# Patient Record
Sex: FEMALE | Race: BLACK OR AFRICAN AMERICAN | Employment: OTHER | URBAN - METROPOLITAN AREA
[De-identification: names, ages, dates, MRNs, and addresses within clinical notes are randomized per-mention and may not be internally consistent; named-entity substitution may affect disease eponyms.]

---

## 2017-11-22 ENCOUNTER — HOSPITAL ENCOUNTER (OUTPATIENT)
Dept: PHYSICAL THERAPY | Age: 72
Discharge: HOME | End: 2017-11-22
Payer: MEDICARE

## 2017-11-22 PROCEDURE — G8984 CARRY CURRENT STATUS: HCPCS

## 2017-11-22 PROCEDURE — 97162 PT EVAL MOD COMPLEX 30 MIN: CPT

## 2017-11-22 PROCEDURE — 97110 THERAPEUTIC EXERCISES: CPT

## 2017-11-22 PROCEDURE — G8985 CARRY GOAL STATUS: HCPCS

## 2017-11-22 NOTE — PROGRESS NOTES
Ambulatory/Rehab Services H2 Model Falls Risk Assessment    Risk Factor Pts. ·   Confusion/Disorientation/Impulsivity  []    4 ·   Symptomatic Depression  []   2 ·   Altered Elimination  []   1 ·   Dizziness/Vertigo  []   1 ·   Gender (Male)  []   1 ·   Any administered antiepileptics (anticonvulsants):  []   2 ·   Any administered benzodiazepines:  []   1 ·   Visual Impairment (specify):  []   1 ·   Portable Oxygen Use  []   1 ·   Orthostatic ? BP  []   1 ·   History of Recent Falls (within 3 mos.)  []   5     Ability to Rise from Chair (choose one) Pts. ·   Ability to rise in a single movement  []   0 ·   Pushes up, successful in one attempt  []   1 ·   Multiple attempts, but successful  []   3 ·   Unable to rise without assistance  []   4   Total: (5 or greater = High Risk) 0     Falls Prevention Plan:   []                Physical Limitations to Exercise (specify):   []                Mobility Assistance Device (type):   []                Exercise/Equipment Adaptation (specify):    ©2010 [facility] [address] Patent #[id number].  Federal Law prohibits the replication, distribution or use without written permission from [facility] [email or web address]

## 2017-11-22 NOTE — THERAPY EVALUATION
Marisa Velasquez  : 1945  Payor: Kandi Claude / Plan: [facility] HUMANA MEDICARE CHOICE PPO/PFFS / Product Type: Managed Care Medicare /  [facility] [place]  at [facility]  [address], [address]  [phone]   [phone]        OUTPATIENT PHYSICAL THERAPY:Initial Assessment 2017    ICD-10: Treatment Diagnosis: M62.81  Muscle weakness generalized  Precautions/Allergies:   Latex; Lortab [hydrocodone-acetaminophen]; and Statins-hmg-coa reductase inhibitors   Fall Risk Score: 0 (? 5 = High Risk)  MD Orders: eval and treat MEDICAL/REFERRING DIAGNOSIS:  Multiple myeloma in relapse [C90.02]   DATE OF ONSET: late August/Sept   REFERRING PHYSICIAN: Artemio Andres DO  RETURN PHYSICIAN APPOINTMENT: TBD     INITIAL ASSESSMENT:  Ms. Tracy Wen presents with weakness in the R shoulder that is suspicious of a brachial plexus involvement. She denies any injury or apparent cause of this weakness. She denies pain. Onset was in late August or early Sept and she has not noticed any change. She should benefit from PT to help strengthen R shoulder, maintain range of motion and improve function R shoulder. PROBLEM LIST (Impacting functional limitations):  1. Decreased Strength  2. Decreased ADL/Functional Activities  3. Decreased Activity Tolerance INTERVENTIONS PLANNED:  1. Home Exercise Program (HEP)  2. Manual Therapy  3. Neuromuscular Re-education/Strengthening  4. Range of Motion (ROM)  5. Therapeutic Exercise/Strengthening  6. Modalities as needed, including Estim for strengthening   TREATMENT PLAN:  Effective Dates: 2017 TO 2018. Frequency/Duration: 2 times a week for 8 weeks, and upon reassessment will adjust frequency and duration as progress indicates. GOALS: (Goals have been discussed and agreed upon with patient.)  Short-Term Functional Goals: Time Frame: 4 weeks  1. Improve R shoulder flexion to allow hand to top of head  2.  Pt able to use fork with R hand  3. Pt to be independent witn initial ex for R shoulder stretching  Discharge Goals: Time Frame: 12 weeks  1. Improve score on DASH by 5 points, for improved R shoulder function  2. Pt to demonstrate at least 3- shoulder flexion  3. Pt to be able to position hand for functional ADLs  4. Pt to be independent in HEP  Rehabilitation Potential For Stated Goals: [facility] therapy, I certify that the treatment plan above will be carried out by a therapist or under their direction. Thank you for this referral,  Reshma Ruiz, PT     Referring Physician Signature: Vivek Sparks DO              Date                    The information in this section was collected on 11-22 (except where otherwise noted). HISTORY:   History of Present Injury/Illness (Reason for Referral):  Pt reported that she woke one morning in late August or Sept and could not lift the R arm. She denies any pain, or numbness in the arm. No trauma to cause onset. She did mention bilateral upper trap pain about 2 weeks before this onset. She reports that she has had MRI, CT, PET scans --all inconclusive. Past Medical History/Comorbidities:   Ms. Rica Amaya  has a past medical history of Cancer ([address] and Hypertension. She also has no past medical history of Arrhythmia; Arthritis; Asthma; Autoimmune disease ([address] CAD (coronary artery disease); Chronic kidney disease; Chronic pain; Coagulation defects; COPD; Diabetes ([address] GERD (gastroesophageal reflux disease); Heart failure ([address] Liver disease; Other ill-defined conditions; Psychiatric disorder; PUD (peptic ulcer disease); Seizures ([address] Stroke [facility]); Thromboembolus ([address] or Thyroid disease. Ms. Rica Amaya  has a past surgical history that includes hysterectomy. Social History/Living Environment:     .  is 80 yr old. Able to provide any needed assistance. Prior Level of Function/Work/Activity:  Long ago retired.     Dominant Side: RIGHT  Personal Factors:          Age:  67 y.o. Current Medications:       Current Outpatient Prescriptions:     pregabalin (LYRICA) 100 mg capsule, Take 100 mg by mouth two (2) times a day., Disp: , Rfl:     penicillin v potassium (VEETID) 500 mg tablet, Take 500 mg by mouth two (2) times a day., Disp: , Rfl:     ciprofloxacin (CIPRO) 500 mg tablet, Take 500 mg by mouth two (2) times a day., Disp: , Rfl:     doxepin (SINEQUAN) 25 mg capsule, Take  by mouth nightly., Disp: , Rfl:     triamterene-hydrochlorothiazide (MAXZIDE-25MG) 37.5-25 mg per tablet, Take  by mouth daily. , Disp: , Rfl:     amlodipine (NORVASC) 2.5 mg tablet, Take  by mouth daily. , Disp: , Rfl:     naproxen (NAPROSYN) 500 mg tablet, Take 500 mg by mouth two (2) times daily (with meals). , Disp: , Rfl:     tizanidine (ZANAFLEX) 2 mg tablet, Take  by mouth., Disp: , Rfl:     acyclovir (ZOVIRAX) 400 mg tablet, Take 400 mg by mouth every four (4) hours (while awake). , Disp: , Rfl:     aspirin (ASPIRIN) 325 mg tablet, Take 325 mg by mouth daily. , Disp: , Rfl:    Date Last Reviewed:  11/22/2017   Number of Personal Factors/Comorbidities that affect the Plan of Care: 1-2: MODERATE COMPLEXITY   EXAMINATION:   Observation/Orthostatic Postural Assessment:          Pt moves without apparent difficulty or compensatory patterns. She holds the arm close to her side without any arm swing in ambulation. ROM:          Passive ROM is full and painfree. Strength:             Date:  11-22 Date:   Date:     Activity/Exercise Parameters Parameters Parameters   R shoulder flex 2-     Shoulder abd 2-     Shoulder extn 3+     ER 2- to 2     IR 2+     pronation 3+     supination 2 to 2+     Elbow flex 2-     triceps 3- to 3     Wrist extn/flex 4 to 4+     Thumb extn 4+         Neurological Screen:        Dermatomes:  Not tested at initial assessment. ** Pt denied any sensory changes in the R hand.      Body Structures Involved:  1. Nerves  2. Bones  3. Joints  4. Muscles  5. Ligaments Body Functions Affected:  1. Mental  2. Sensory/Pain  3. Neuromusculoskeletal  4. Movement Related Activities and Participation Affected:  1. General Tasks and Demands  2. Mobility  3. Self Care  4. Domestic Life  5. Interpersonal Interactions and Relationships  6. Community, Social and [place]   Number of elements (examined above) that affect the Plan of Care: 3: MODERATE COMPLEXITY   CLINICAL PRESENTATION:   Presentation: Evolving clinical presentation with changing clinical characteristics: MODERATE COMPLEXITY   CLINICAL DECISION MAKING:   Outcome Measure: Tool Used: Disabilities of the Arm, Shoulder and Hand (DASH) Questionnaire - Quick Version  Score:  Initial: 31/55  Most Recent: X/55 (Date: -- )   Interpretation of Score: The DASH is designed to measure the activities of daily living in person's with upper extremity dysfunction or pain. Each section is scored on a 1-5 scale, 5 representing the greatest disability. The scores of each section are added together for a total score of 55. Score 11 12-19 20-28 29-37 38-45 46-54 55   Modifier CH CI CJ CK CL CM CN     ? Carrying, Moving, and Handling Objects:     - CURRENT STATUS: CK - 40%-59% impaired, limited or restricted    - GOAL STATUS: CJ - 20%-39% impaired, limited or restricted    - D/C STATUS:  ---------------To be determined---------------        Medical Necessity:   · Skilled intervention continues to be required due to sudden onset of R shoulder weakness. Reason for Services/Other Comments:  · Patient continues to demonstrate capacity to improve strength which will improve her ability to use the R arm.  .   Use of outcome tool(s) and clinical judgement create a POC that gives a: Questionable prediction of patient's progress: MODERATE COMPLEXITY            TREATMENT:   (In addition to Assessment/Re-Assessment sessions the following treatments were rendered)  Pre-treatment Symptoms/Complaints:  Pt reports that she cannot use the R shoulder, for no apparent reason for the weakness. Pain: Initial: Pain Intensity 1: 0  Post Session:       Evaluation (X)  Therapeutic Exercise   (  20  min ):  To decrease pain, improve flexibility and motion, and increase strength. Will provide verbal and manual cues as needed to ensure proper performance of the exercises. Will increase range of motion, resistance and intensity as pt tolerates. Pt did lots of stretching, for the R shoulder, in supine, with wand  She did press ups, and overhead flexion--could go to nearly full range for flexion  Worked with therapist for overhead stabilization  Did supported triceps ex in supine  Did stretching ex in standing with wand  Encouraged pt to use wand for these stretches at home    Manual Therapy  (    ):    Modalities  (   ):    Odin[employer]  Treatment/Session Assessment:    Pt tolerated tx well. No pain with stretching to full range. No pain with active asst exercise. She reported that she shoulder felt better after stretching. Seems committed to continuing to stretch at home. · Response to Treatment:  Reported that her arm felt better following therapy. · Compliance with Program/Exercises: Will assess as treatment progresses. · Recommendations/Intent for next treatment session:      Next visit will focus on improving strength and motion R shoulder.   Total Treatment Duration:   60 min  Total number of treatments:   1  PT Patient Time In/Time Out  Time In: 0345  Time Out: [address], PT

## 2017-11-30 ENCOUNTER — HOSPITAL ENCOUNTER (OUTPATIENT)
Dept: PHYSICAL THERAPY | Age: 72
Discharge: HOME | End: 2017-11-30
Payer: MEDICARE

## 2017-11-30 PROCEDURE — 97110 THERAPEUTIC EXERCISES: CPT

## 2017-11-30 NOTE — PROGRESS NOTES
Dipak Velasco  : 1945  Payor: Galina Scruggs / Plan: [facility] HUMANA MEDICARE CHOICE PPO/PFFS / Product Type: Managed Care Medicare /  [facility] [place]  at [facility] Therapy  [address], [address]  [phone]   [phone]        OUTPATIENT PHYSICAL THERAPY:  Daily Note 2017    ICD-10: Treatment Diagnosis: M62.81  Muscle weakness generalized  Precautions/Allergies:   Latex; Lortab [hydrocodone-acetaminophen]; and Statins-hmg-coa reductase inhibitors   Fall Risk Score: 0 (? 5 = High Risk)  MD Orders: eval and treat MEDICAL/REFERRING DIAGNOSIS:  Multiple myeloma in relapse [C90.02]   DATE OF ONSET: late August/Sept   REFERRING PHYSICIAN: Fawn Drummnod DO  RETURN PHYSICIAN APPOINTMENT: TBD     INITIAL ASSESSMENT:  Ms. Ulisses Thakur presents with weakness in the R shoulder that is suspicious of a brachial plexus involvement. She denies any injury or apparent cause of this weakness. She denies pain. Onset was in late August or early Sept and she has not noticed any change. She should benefit from PT to help strengthen R shoulder, maintain range of motion and improve function R shoulder. PROBLEM LIST (Impacting functional limitations):  1. Decreased Strength  2. Decreased ADL/Functional Activities  3. Decreased Activity Tolerance INTERVENTIONS PLANNED:  1. Home Exercise Program (HEP)  2. Manual Therapy  3. Neuromuscular Re-education/Strengthening  4. Range of Motion (ROM)  5. Therapeutic Exercise/Strengthening  6. Modalities as needed, including Estim for strengthening   TREATMENT PLAN:  Effective Dates: 2017 TO 2018. Frequency/Duration: 2 times a week for 8 weeks, and upon reassessment will adjust frequency and duration as progress indicates. GOALS: (Goals have been discussed and agreed upon with patient.)  Short-Term Functional Goals: Time Frame: 4 weeks  1. Improve R shoulder flexion to allow hand to top of head  2.  Pt able to use fork with R hand  3. Pt to be independent witn initial ex for R shoulder stretching  Discharge Goals: Time Frame: 12 weeks  1. Improve score on DASH by 5 points, for improved R shoulder function  2. Pt to demonstrate at least 3- shoulder flexion  3. Pt to be able to position hand for functional ADLs  4. Pt to be independent in HEP  Rehabilitation Potential For Stated Goals: [address], PT                 The information in this section was collected on 11-22 (except where otherwise noted). HISTORY:   History of Present Injury/Illness (Reason for Referral):  Pt reported that she woke one morning in late August or Sept and could not lift the R arm. She denies any pain, or numbness in the arm. No trauma to cause onset. She did mention bilateral upper trap pain about 2 weeks before this onset. She reports that she has had MRI, CT, PET scans --all inconclusive. Past Medical History/Comorbidities:   Ms. Daljit Escalante  has a past medical history of Cancer ([address] and Hypertension. She also has no past medical history of Arrhythmia; Arthritis; Asthma; Autoimmune disease ([address] CAD (coronary artery disease); Chronic kidney disease; Chronic pain; Coagulation defects; COPD; Diabetes ([address] GERD (gastroesophageal reflux disease); Heart failure ([address] Liver disease; Other ill-defined conditions; Psychiatric disorder; PUD (peptic ulcer disease); Seizures ([address] Stroke [facility]); Thromboembolus ([address] or Thyroid disease. Ms. Daljit Escalante  has a past surgical history that includes hysterectomy. Social History/Living Environment:     .  is 80 yr old. Able to provide any needed assistance. Prior Level of Function/Work/Activity:  Long ago retired. Dominant Side:         RIGHT  Personal Factors:          Age:  67 y.o.   Current Medications:       Current Outpatient Prescriptions:     pregabalin (LYRICA) 100 mg capsule, Take 100 mg by mouth two (2) times a day., Disp: , Rfl:     penicillin v potassium (VEETID) 500 mg tablet, Take 500 mg by mouth two (2) times a day., Disp: , Rfl:     ciprofloxacin (CIPRO) 500 mg tablet, Take 500 mg by mouth two (2) times a day., Disp: , Rfl:     doxepin (SINEQUAN) 25 mg capsule, Take  by mouth nightly., Disp: , Rfl:     triamterene-hydrochlorothiazide (MAXZIDE-25MG) 37.5-25 mg per tablet, Take  by mouth daily. , Disp: , Rfl:     amlodipine (NORVASC) 2.5 mg tablet, Take  by mouth daily. , Disp: , Rfl:     naproxen (NAPROSYN) 500 mg tablet, Take 500 mg by mouth two (2) times daily (with meals). , Disp: , Rfl:     tizanidine (ZANAFLEX) 2 mg tablet, Take  by mouth., Disp: , Rfl:     acyclovir (ZOVIRAX) 400 mg tablet, Take 400 mg by mouth every four (4) hours (while awake). , Disp: , Rfl:     aspirin (ASPIRIN) 325 mg tablet, Take 325 mg by mouth daily. , Disp: , Rfl:    Date Last Reviewed:  11/30/2017   Number of Personal Factors/Comorbidities that affect the Plan of Care: 1-2: MODERATE COMPLEXITY   EXAMINATION:   Observation/Orthostatic Postural Assessment:          Pt moves without apparent difficulty or compensatory patterns. She holds the arm close to her side without any arm swing in ambulation. ROM:          Passive ROM is full and painfree. Strength:             Date:  11-22 Date:   Date:     Activity/Exercise Parameters Parameters Parameters   R shoulder flex 2-     Shoulder abd 2-     Shoulder extn 3+     ER 2- to 2     IR 2+     pronation 3+     supination 2 to 2+     Elbow flex 2-     triceps 3- to 3     Wrist extn/flex 4 to 4+     Thumb extn 4+         Neurological Screen:        Dermatomes:  Not tested at initial assessment. ** Pt denied any sensory changes in the R hand. Body Structures Involved:  1. Nerves  2. Bones  3. Joints  4. Muscles  5. Ligaments Body Functions Affected:  1. Mental  2. Sensory/Pain  3. Neuromusculoskeletal  4. Movement Related Activities and Participation Affected:  1. General Tasks and Demands  2. Mobility  3. Self Care  4.  Domestic Life  5. Interpersonal Interactions and Relationships  6. Community, Social and [place]   Number of elements (examined above) that affect the Plan of Care: 3: MODERATE COMPLEXITY   CLINICAL PRESENTATION:   Presentation: Evolving clinical presentation with changing clinical characteristics: MODERATE COMPLEXITY   CLINICAL DECISION MAKING:   Outcome Measure: Tool Used: Disabilities of the Arm, Shoulder and Hand (DASH) Questionnaire - Quick Version  Score:  Initial: 31/55  Most Recent: X/55 (Date: -- )   Interpretation of Score: The DASH is designed to measure the activities of daily living in person's with upper extremity dysfunction or pain. Each section is scored on a 1-5 scale, 5 representing the greatest disability. The scores of each section are added together for a total score of 55. Score 11 12-19 20-28 29-37 38-45 46-54 55   Modifier CH CI CJ CK CL CM CN     ? Carrying, Moving, and Handling Objects:     - CURRENT STATUS: CK - 40%-59% impaired, limited or restricted    - GOAL STATUS: CJ - 20%-39% impaired, limited or restricted    - D/C STATUS:  ---------------To be determined---------------        Medical Necessity:   · Skilled intervention continues to be required due to sudden onset of R shoulder weakness. Reason for Services/Other Comments:  · Patient continues to demonstrate capacity to improve strength which will improve her ability to use the R arm. .   Use of outcome tool(s) and clinical judgement create a POC that gives a: Questionable prediction of patient's progress: MODERATE COMPLEXITY            TREATMENT:   (In addition to Assessment/Re-Assessment sessions the following treatments were rendered)    Pre-treatment Symptoms/Complaints:  No change in the R arm since starting therapy.  Pt wondered if this could be related to a pneumonia vaccine she had shortly before her arm became dysfunctional.      Pain: Initial: Pain Intensity 1: 2 --she mentioned pain in L (nonaffected) shoulder Post Session:  0       Therapeutic Exercise   (  60  min ):  To decrease pain, improve flexibility and motion, and increase strength. Will provide verbal and manual cues as needed to ensure proper performance of the exercises. Will increase range of motion, resistance and intensity as pt tolerates. Pulleys--6 min, 2 positions. Resisted push -pull against therapist in sitting  AAROM for ER/IR in sitting--ER is very weak, poor quality movement in this posn  Supine punch-min asst--1 x 15  Small flex/extn starting with arm at 90 ° supine---good control for small movements  Triceps--2#--1 x 15  Biceps curls--needs min asst for this in supine--1 x 12  Hammer curls--min guidance--1 x 15  horiz abd-add in supine--min asst for small motions  Standing bent over--extn--1 x 10--can do this to hip height  Bent over flex--small motion  Bent over, upward pull   Worked briefly with hand on wall , for isometric support, small up/down motions    Manual Therapy  (    ):    Modalities  (   ):    [employer]-   Pt given pulley for home    Treatment/Session Assessment:    Pt tolerated tx well. No pain with stretching to full range. No pain with active asst exercise. Seems committed to continuing to stretch at home with pulley. No change in function. Pt plans to try to find out if there is a link between her apparent neuritis and the pneumonia vaccine. Pt contacted physician office to voice concern about brachial plexus injury. · Response to Treatment:  Reported that her arm felt better following therapy. · Compliance with Program/Exercises: pt reports that she stretches and tries to use the arm more at home. · Recommendations/Intent for next treatment session:      Next visit will focus on improving strength and motion R shoulder.   Total Treatment Duration:   60 min  Total number of treatments:   2  PT Patient Time In/Time Out  Time In: 0345  Time Out: [address], PT

## 2017-12-07 ENCOUNTER — HOSPITAL ENCOUNTER (OUTPATIENT)
Dept: PHYSICAL THERAPY | Age: 72
Discharge: HOME | End: 2017-12-07

## 2017-12-07 NOTE — PROGRESS NOTES
Monse Reyes  : 1945  Primary: Zulma Majumana Medicare Choice *  Secondary:  [address] at [facility] Therapy  [address], [facility] [address]  [phone]   [phone]        OUTPATIENT DAILY NOTE    NAME/AGE/GENDER: Monse Reyes is a 67 y.o. female. DATE: 2017    Patient called to cancel for appointment today due to no reason given. Will plan to follow up on next scheduled visit.     Early Nisha, PT

## 2017-12-12 ENCOUNTER — APPOINTMENT (OUTPATIENT)
Dept: PHYSICAL THERAPY | Age: 72
End: 2017-12-12

## 2017-12-19 ENCOUNTER — APPOINTMENT (OUTPATIENT)
Dept: PHYSICAL THERAPY | Age: 72
End: 2017-12-19

## 2017-12-28 ENCOUNTER — HOSPITAL ENCOUNTER (OUTPATIENT)
Dept: PHYSICAL THERAPY | Age: 72
Discharge: HOME | End: 2017-12-28

## 2017-12-28 NOTE — PROGRESS NOTES
Gunner Reza  : 1945  Primary: Joyce Donald Medicare Choice *  Secondary:  [address] [place]  at [facility] Therapy  [address], [facility] [address]  [phone]   [phone]        OUTPATIENT DAILY NOTE    NAME/AGE/GENDER: Gunner Reza is a 67 y.o. female. DATE: 2017    Patient cancelled  appointment today due to unknown reason.       Jessica Upton, PTA

## 2018-05-07 NOTE — THERAPY DISCHARGE
Silvia Taylor  : 1945  Payor: Sanjiv Dawn / Plan: [facility] HU[employer]NA MEDICARE CHOICE PPO/PFFS / Product Type: Managed Care Medicare /  [facility] [place]  at [facility]  [address], [address]  [phone]   [phone]        OUTPATIENT PHYSICAL THERAPY: Discontinuation Summary    ICD-10: Treatment Diagnosis: M62.81  Muscle weakness generalized  Precautions/Allergies:   Latex; Lortab [hydrocodone-acetaminophen]; and Statins-hmg-coa reductase inhibitors   Fall Risk Score: 0 (? 5 = High Risk)  MD Orders: eval and treat MEDICAL/REFERRING DIAGNOSIS:  Multiple myeloma in relapse [C90.02]   DATE OF ONSET: late August/Sept   REFERRING PHYSICIAN: Angie Chambers DO  RETURN PHYSICIAN APPOINTMENT: TBD      ASSESSMENT:  Ms. Jesse Barragan presents with weakness in the R shoulder that is suspicious of a brachial plexus involvement. She denies any injury or apparent cause of this weakness. She denies pain. Onset was in late August or early Sept and she has not noticed any change. She should benefit from PT to help strengthen R shoulder, maintain range of motion and improve function R shoulder. 5-7:    Pt attended only 2 visits to PT for treatment of R arm pain and dysfunction. She gave no reasons for cancelling several scheduled appointments. Last known status is as reported on 2017. Physical therapy has been discontinued. PROBLEM LIST (Impacting functional limitations):  1. Decreased Strength  2. Decreased ADL/Functional Activities  3. Decreased Activity Tolerance INTERVENTIONS PLANNED:  1. Home Exercise Program (HEP)  2. Manual Therapy  3. Neuromuscular Re-education/Strengthening  4. Range of Motion (ROM)  5. Therapeutic Exercise/Strengthening  6. Modalities as needed, including Estim for strengthening   TREATMENT PLAN:  Effective Dates: 2017 TO 2018. Frequency/Duration:Physical therapy has been discontinued.       GOALS: (Goals have been discussed and agreed upon with patient.)  Short-Term Functional Goals: Time Frame: 4 weeks-- goals not reassessed due unexpected stoppage of appointments. 1. Improve R shoulder flexion to allow hand to top of head  2. Pt able to use fork with R hand  3. Pt to be independent [facility] initial ex for R shoulder stretching  Discharge Goals: Time Frame: 12 weeks  1. Improve score on DASH by 5 points, for improved R shoulder function  2. Pt to demonstrate at least 3- shoulder flexion  3. Pt to be able to position hand for functional ADLs  4.  Pt to be independent in [address] [place] [address]

## 2018-11-30 ENCOUNTER — APPOINTMENT (OUTPATIENT)
Dept: CT IMAGING | Age: 73
End: 2018-11-30
Attending: EMERGENCY MEDICINE
Payer: MEDICARE

## 2018-11-30 ENCOUNTER — HOSPITAL ENCOUNTER (EMERGENCY)
Age: 73
Discharge: HOME | End: 2018-11-30
Attending: EMERGENCY MEDICINE
Payer: MEDICARE

## 2018-11-30 VITALS
TEMPERATURE: 98 F | DIASTOLIC BLOOD PRESSURE: 82 MMHG | RESPIRATION RATE: 16 BRPM | SYSTOLIC BLOOD PRESSURE: 141 MMHG | WEIGHT: 200 LBS | HEART RATE: 66 BPM | HEIGHT: 63 IN | BODY MASS INDEX: 35.44 KG/M2 | OXYGEN SATURATION: 100 %

## 2018-11-30 DIAGNOSIS — I72.2 RENAL ARTERY ANEURYSM (HCC): ICD-10-CM

## 2018-11-30 DIAGNOSIS — R31.9 HEMATURIA, UNSPECIFIED TYPE: ICD-10-CM

## 2018-11-30 DIAGNOSIS — R10.32 ABDOMINAL PAIN, LLQ (LEFT LOWER QUADRANT): Primary | ICD-10-CM

## 2018-11-30 LAB
ALBUMIN SERPL-MCNC: 3.8 G/DL (ref 3.2–4.6)
ALBUMIN/GLOB SERPL: 1.2 {RATIO}
ALP SERPL-CCNC: 93 U/L (ref 50–136)
ALT SERPL-CCNC: 20 U/L (ref 12–65)
ANION GAP SERPL CALC-SCNC: 10 MMOL/L
AST SERPL-CCNC: 19 U/L (ref 15–37)
BACTERIA URNS QL MICRO: ABNORMAL /HPF
BASOPHILS # BLD: 0.1 K/UL (ref 0–0.2)
BASOPHILS NFR BLD: 1 % (ref 0–2)
BILIRUB SERPL-MCNC: 1.4 MG/DL (ref 0.2–1.1)
BUN SERPL-MCNC: 10 MG/DL (ref 8–23)
CALCIUM SERPL-MCNC: 10 MG/DL (ref 8.3–10.4)
CASTS URNS QL MICRO: 0 /LPF
CHLORIDE SERPL-SCNC: 110 MMOL/L (ref 98–107)
CO2 SERPL-SCNC: 26 MMOL/L (ref 21–32)
CREAT SERPL-MCNC: 0.97 MG/DL (ref 0.6–1)
CRYSTALS URNS QL MICRO: 0 /LPF
DIFFERENTIAL METHOD BLD: ABNORMAL
EOSINOPHIL # BLD: 0.1 K/UL (ref 0–0.8)
EOSINOPHIL NFR BLD: 2 % (ref 0.5–7.8)
EPI CELLS #/AREA URNS HPF: ABNORMAL /HPF
ERYTHROCYTE [DISTWIDTH] IN BLOOD BY AUTOMATED COUNT: 14.4 % (ref 11.9–14.6)
GLOBULIN SER CALC-MCNC: 3.2 G/DL (ref 2.3–3.5)
GLUCOSE SERPL-MCNC: 120 MG/DL (ref 65–100)
HCT VFR BLD AUTO: 36.2 % (ref 35.8–46.3)
HGB BLD-MCNC: 12.1 G/DL (ref 11.7–15.4)
IMM GRANULOCYTES # BLD: 0 K/UL (ref 0–0.5)
IMM GRANULOCYTES NFR BLD AUTO: 0 % (ref 0–5)
LYMPHOCYTES # BLD: 2.3 K/UL (ref 0.5–4.6)
LYMPHOCYTES NFR BLD: 30 % (ref 13–44)
MCH RBC QN AUTO: 25.7 PG (ref 26.1–32.9)
MCHC RBC AUTO-ENTMCNC: 33.4 G/DL (ref 31.4–35)
MCV RBC AUTO: 76.9 FL (ref 79.6–97.8)
MONOCYTES # BLD: 0.5 K/UL (ref 0.1–1.3)
MONOCYTES NFR BLD: 6 % (ref 4–12)
MUCOUS THREADS URNS QL MICRO: ABNORMAL /LPF
NEUTS SEG # BLD: 4.6 K/UL (ref 1.7–8.2)
NEUTS SEG NFR BLD: 61 % (ref 43–78)
NRBC # BLD: 0 K/UL (ref 0–0.2)
OTHER OBSERVATIONS,UCOM: ABNORMAL
PLATELET # BLD AUTO: 219 K/UL (ref 150–450)
PMV BLD AUTO: 10 FL (ref 9.4–12.3)
POTASSIUM SERPL-SCNC: 3.3 MMOL/L (ref 3.5–5.1)
PROT SERPL-MCNC: 7 G/DL
RBC # BLD AUTO: 4.71 M/UL (ref 4.05–5.2)
RBC #/AREA URNS HPF: >100 /HPF
SODIUM SERPL-SCNC: 146 MMOL/L (ref 136–145)
WBC # BLD AUTO: 7.6 K/UL (ref 4.3–11.1)
WBC URNS QL MICRO: ABNORMAL /HPF

## 2018-11-30 PROCEDURE — 80053 COMPREHEN METABOLIC PANEL: CPT

## 2018-11-30 PROCEDURE — 74011636320 HC RX REV CODE- 636/320: Performed by: EMERGENCY MEDICINE

## 2018-11-30 PROCEDURE — 81015 MICROSCOPIC EXAM OF URINE: CPT

## 2018-11-30 PROCEDURE — 74011000258 HC RX REV CODE- 258: Performed by: EMERGENCY MEDICINE

## 2018-11-30 PROCEDURE — 74177 CT ABD & PELVIS W/CONTRAST: CPT

## 2018-11-30 PROCEDURE — 99284 EMERGENCY DEPT VISIT MOD MDM: CPT | Performed by: EMERGENCY MEDICINE

## 2018-11-30 PROCEDURE — 85025 COMPLETE CBC W/AUTO DIFF WBC: CPT

## 2018-11-30 PROCEDURE — 81003 URINALYSIS AUTO W/O SCOPE: CPT | Performed by: EMERGENCY MEDICINE

## 2018-11-30 RX ORDER — SODIUM CHLORIDE 0.9 % (FLUSH) 0.9 %
10 SYRINGE (ML) INJECTION
Status: COMPLETED | OUTPATIENT
Start: 2018-11-30 | End: 2018-11-30

## 2018-11-30 RX ORDER — CEPHALEXIN 500 MG/1
500 CAPSULE ORAL 4 TIMES DAILY
Qty: 28 CAP | Refills: 0 | Status: SHIPPED | OUTPATIENT
Start: 2018-11-30 | End: 2018-12-07

## 2018-11-30 RX ORDER — TRAMADOL HYDROCHLORIDE 50 MG/1
50-100 TABLET ORAL
Qty: 20 TAB | Refills: 0 | Status: SHIPPED | OUTPATIENT
Start: 2018-11-30

## 2018-11-30 RX ORDER — CEPHALEXIN 500 MG/1
500 CAPSULE ORAL
Status: DISCONTINUED | OUTPATIENT
Start: 2018-11-30 | End: 2018-11-30

## 2018-11-30 RX ORDER — ONDANSETRON 8 MG/1
8 TABLET, ORALLY DISINTEGRATING ORAL
Qty: 12 TAB | Refills: 1 | Status: SHIPPED | OUTPATIENT
Start: 2018-11-30 | End: 2018-12-17

## 2018-11-30 RX ADMIN — SODIUM CHLORIDE 100 ML: 900 INJECTION, SOLUTION INTRAVENOUS at 12:02

## 2018-11-30 RX ADMIN — Medication 10 ML: at 12:02

## 2018-11-30 RX ADMIN — IOPAMIDOL 100 ML: 755 INJECTION, SOLUTION INTRAVENOUS at 12:03

## 2018-11-30 NOTE — DISCHARGE INSTRUCTIONS
Abdominal Pain: Care Instructions  Your Care Instructions    Abdominal pain has many possible causes. Some aren't serious and get better on their own in a few days. Others need more testing and treatment. If your pain continues or gets worse, you need to be rechecked and may need more tests to find out what is wrong. You may need surgery to correct the problem. Don't ignore new symptoms, such as fever, nausea and vomiting, urination problems, pain that gets worse, and dizziness. These may be signs of a more serious problem. Your doctor may have recommended a follow-up visit in the next 8 to 12 hours. If you are not getting better, you may need more tests or treatment. The doctor has checked you carefully, but problems can develop later. If you notice any problems or new symptoms, get medical treatment right away. Follow-up care is a key part of your treatment and safety. Be sure to make and go to all appointments, and call your doctor if you are having problems. It's also a good idea to know your test results and keep a list of the medicines you take. How can you care for yourself at home? · Rest until you feel better. · To prevent dehydration, drink plenty of fluids, enough so that your urine is light yellow or clear like water. Choose water and other caffeine-free clear liquids until you feel better. If you have kidney, heart, or liver disease and have to limit fluids, talk with your doctor before you increase the amount of fluids you drink. · If your stomach is upset, eat mild foods, such as rice, dry toast or crackers, bananas, and applesauce. Try eating several small meals instead of two or three large ones. · Wait until 48 hours after all symptoms have gone away before you have spicy foods, alcohol, and drinks that contain caffeine. · Do not eat foods that are high in fat. · Avoid anti-inflammatory medicines such as aspirin, ibuprofen (Advil, Motrin), and naproxen (Aleve).  These can cause stomach upset. Talk to your doctor if you take daily aspirin for another health problem. When should you call for help? Call 911 anytime you think you may need emergency care. For example, call if:    · You passed out (lost consciousness).     · You pass maroon or very bloody stools.     · You vomit blood or what looks like coffee grounds.     · You have new, severe belly pain.    Call your doctor now or seek immediate medical care if:    · Your pain gets worse, especially if it becomes focused in one area of your belly.     · You have a new or higher fever.     · Your stools are black and look like tar, or they have streaks of blood.     · You have unexpected vaginal bleeding.     · You have symptoms of a urinary tract infection. These may include:  ? Pain when you urinate. ? Urinating more often than usual.  ? Blood in your urine.     · You are dizzy or lightheaded, or you feel like you may faint.    Watch closely for changes in your health, and be sure to contact your doctor if:    · You are not getting better after 1 day (24 hours). Where can you learn more? Go to http://christelInline.meion.info/. Enter T292 in the search box to learn more about \"Abdominal Pain: Care Instructions. \"  Current as of: November 20, 2017  Content Version: 11.8  © 7823-0336 [employer]. Care instructions adapted under license by [employer] (which disclaims liability or warranty for this information). If you have questions about a medical condition or this instruction, always ask your healthcare professional. [place] [address] any warranty or liability for your use of this information. Blood in the Urine: Care Instructions  Your Care Instructions    Blood in the urine, or hematuria, may make the urine look red, brown, or pink. There may be blood every time you urinate or just from time to time.  You cannot always see blood in the urine, but it will show up in a urine test.  Blood in the urine may be serious. It should always be checked by a doctor. Your doctor may recommend more tests, including an X-ray, a CT scan, or a cystoscopy (which lets a doctor look inside the urethra and bladder). Blood in the urine can be a sign of another problem. Common causes are bladder infections and kidney stones. An injury to your groin or your genital area can also cause bleeding in the urinary tract. Very hard exercise--such as running a marathon--can cause blood in the urine. Blood in the urine can also be a sign of kidney disease or cancer in the bladder or kidney. Many cases of blood in the urine are caused by a harmless condition that runs in families. This is called benign familial hematuria. It does not need any treatment. Sometimes your urine may look red or brown even though it does not contain blood. For example, not getting enough fluids (dehydration), taking certain medicines, or having a liver problem can change the color of your urine. Eating foods such as beets, rhubarb, or blackberries or foods with red food coloring can make your urine look red or pink. Follow-up care is a key part of your treatment and safety. Be sure to make and go to all appointments, and call your doctor if you are having problems. It's also a good idea to know your test results and keep a list of the medicines you take. When should you call for help? Call your doctor now or seek immediate medical care if:    · You have symptoms of a urinary infection. For example:  ? You have pus in your urine. ? You have pain in your back just below your rib cage. This is called flank pain. ? You have a fever, chills, or body aches. ? It hurts to urinate. ? You have groin or belly pain.     · You have more blood in your urine.    Watch closely for changes in your health, and be sure to contact your doctor if:    · You have new urination problems.     · You do not get better as expected.    Where can you learn more?  Go to http://christel-ion.info/. Enter U102 in the search box to learn more about \"Blood in the Urine: Care Instructions. \"  Current as of: March 21, 2018  Content Version: 11.8  © 4507-8747 [employer]. Care instructions adapted under license by [employer] (which disclaims liability or warranty for this information). If you have questions about a medical condition or this instruction, always ask your healthcare professional. [place] [address] any warranty or liability for your use of this information.

## 2018-11-30 NOTE — ED PROVIDER NOTES
Chief complaint : abdominal pain HISTORY OF PRESENT ILLNESS : 
Location : [facility] Quality : sharp Quantity : constant Timing : woke her form sleep around 04:00am 
 
Severity : mild/moderate Alleviating / exacerbating factors : better after two ultram around 8am, worsened slightly when traversing railroad tracks en route to the e.r. Associated Symptoms : mild nausea no vomiting Past Medical History:  
Diagnosis Date  Cancer ([facility] [address] multiple myeloma  Hypertension Past Surgical History:  
Procedure Laterality Date  HX HYSTERECTOMY History reviewed. No pertinent family history. Social History Socioeconomic History  Marital status:  Spouse name: Not on file  Number of children: Not on file  Years of education: Not on file  Highest education level: Not on file Social Needs  Financial resource strain: Not on file  Food insecurity - worry: Not on file  Food insecurity - inability: Not on file  Transportation needs - medical: Not on file  Transportation needs - non-medical: Not on file Occupational History  Not on file Tobacco Use  Smoking status: Never Smoker  Smokeless tobacco: Never Used Substance and Sexual Activity  Alcohol use: No  
 Drug use: No  
 Sexual activity: Not on file Other Topics Concern  Not on file Social History Narrative  Not on file ALLERGIES: Latex; Lortab [hydrocodone-acetaminophen]; and Statins-hmg-coa reductase inhibitors Review of Systems Constitutional: Negative for chills and fever. HENT: Negative for rhinorrhea and sore throat. Eyes: Negative for discharge and redness. Respiratory: Negative for cough and shortness of breath. Cardiovascular: Negative for chest pain. Gastrointestinal: Positive for abdominal pain and nausea. Negative for diarrhea and vomiting. Genitourinary: Positive for urgency.  Negative for dysuria, flank pain and hematuria. Musculoskeletal: Negative for arthralgias and back pain. Neurological: Negative for dizziness and headaches. All other systems reviewed and are negative. Vitals:  
 11/30/18 2330 BP: (!) 154/91 Pulse: 61 Resp: 19 Temp: 97.7 °F (36.5 °C) SpO2: 100% Weight: 90.7 kg (200 lb) Height: 5' 3\" (1.6 m) Physical Exam  
Constitutional: She is oriented to person, place, and time. She appears well-developed and well-nourished. HENT:  
Head: Normocephalic and atraumatic. Eyes: Conjunctivae are normal. Pupils are equal, round, and reactive to light. Right eye exhibits no discharge. Left eye exhibits no discharge. No scleral icterus. Neck: Normal range of motion. Neck supple. Cardiovascular: Normal rate, regular rhythm and normal heart sounds. Exam reveals no gallop. No murmur heard. Pulmonary/Chest: Effort normal and breath sounds normal. No respiratory distress. She has no wheezes. She has no rales. Abdominal: Soft. Bowel sounds are normal. There is tenderness in the left lower quadrant. There is no guarding. Actually hurting more in the left lower quadrant and right lower quadrant. Musculoskeletal: Normal range of motion. She exhibits no edema. Neurological: She is alert and oriented to person, place, and time. She exhibits normal muscle tone. cni 2-12 grossly Skin: Skin is warm and dry. She is not diaphoretic. Psychiatric: She has a normal mood and affect. Her behavior is normal.  
Nursing note and vitals reviewed. MDM Number of Diagnoses or Management Options Diagnosis management comments: Medical decision making note: 
Left lower quadrant abdominal pain on exam, right lower quadrant pain on history. Mild rebound associated with bumps on the car ride here. Patient has nausea but no vomiting. No fevers. White count is normal, CMP is normal, urinalysis shows large blood.  
We'll check a microscopic on the urine, of note patient has no UTI symptoms other than urgency. Check CT scan for suspicion of diverticulitis based on her left lower quadrant tenderness. This concludes the \"medical decision making note\" part of this emergency department visit note. Procedures

## 2018-11-30 NOTE — ED TRIAGE NOTES
Pt in states right side abdominal pain starting at 0400 today. States nausea. States urinary urgency. States no vomiting or diarrhea. No fever or chills. Took 100mg tramadol with some relief.

## 2018-11-30 NOTE — ED NOTES
I have reviewed discharge instructions with the patient. The patient verbalized understanding. Patient left ED via Discharge Method: wheelchair to Home with (family). Opportunity for questions and clarification provided. Patient given 3 scripts. To continue your aftercare when you leave the hospital, you may receive an automated call from our care team to check in on how you are doing. This is a free service and part of our promise to provide the best care and service to meet your aftercare needs.  If you have questions, or wish to unsubscribe from this service please call [phone]. Thank you for Choosing our [facility] Emergency Department.

## 2018-12-17 PROBLEM — E66.01 SEVERE OBESITY (HCC): Status: ACTIVE | Noted: 2018-12-17
